# Patient Record
Sex: MALE | Race: BLACK OR AFRICAN AMERICAN | NOT HISPANIC OR LATINO | Employment: FULL TIME | URBAN - METROPOLITAN AREA
[De-identification: names, ages, dates, MRNs, and addresses within clinical notes are randomized per-mention and may not be internally consistent; named-entity substitution may affect disease eponyms.]

---

## 2024-04-13 ENCOUNTER — HOSPITAL ENCOUNTER (EMERGENCY)
Facility: HOSPITAL | Age: 24
Discharge: HOME | End: 2024-04-13
Attending: STUDENT IN AN ORGANIZED HEALTH CARE EDUCATION/TRAINING PROGRAM
Payer: COMMERCIAL

## 2024-04-13 VITALS
HEART RATE: 94 BPM | BODY MASS INDEX: 40.43 KG/M2 | HEIGHT: 74 IN | TEMPERATURE: 96.8 F | SYSTOLIC BLOOD PRESSURE: 151 MMHG | WEIGHT: 315 LBS | OXYGEN SATURATION: 99 % | RESPIRATION RATE: 18 BRPM | DIASTOLIC BLOOD PRESSURE: 70 MMHG

## 2024-04-13 DIAGNOSIS — M79.89 LEG SWELLING: Primary | ICD-10-CM

## 2024-04-13 PROCEDURE — 99281 EMR DPT VST MAYX REQ PHY/QHP: CPT | Performed by: STUDENT IN AN ORGANIZED HEALTH CARE EDUCATION/TRAINING PROGRAM

## 2024-04-13 ASSESSMENT — COLUMBIA-SUICIDE SEVERITY RATING SCALE - C-SSRS
1. IN THE PAST MONTH, HAVE YOU WISHED YOU WERE DEAD OR WISHED YOU COULD GO TO SLEEP AND NOT WAKE UP?: NO
2. HAVE YOU ACTUALLY HAD ANY THOUGHTS OF KILLING YOURSELF?: NO
6. HAVE YOU EVER DONE ANYTHING, STARTED TO DO ANYTHING, OR PREPARED TO DO ANYTHING TO END YOUR LIFE?: NO

## 2024-04-13 ASSESSMENT — LIFESTYLE VARIABLES
EVER HAD A DRINK FIRST THING IN THE MORNING TO STEADY YOUR NERVES TO GET RID OF A HANGOVER: NO
EVER FELT BAD OR GUILTY ABOUT YOUR DRINKING: NO
HAVE YOU EVER FELT YOU SHOULD CUT DOWN ON YOUR DRINKING: NO
TOTAL SCORE: 0
HAVE PEOPLE ANNOYED YOU BY CRITICIZING YOUR DRINKING: NO

## 2024-04-13 ASSESSMENT — PAIN SCALES - GENERAL: PAINLEVEL_OUTOF10: 0 - NO PAIN

## 2024-04-13 ASSESSMENT — PAIN - FUNCTIONAL ASSESSMENT: PAIN_FUNCTIONAL_ASSESSMENT: 0-10

## 2024-04-13 NOTE — ED PROVIDER NOTES
HPI   Chief Complaint   Patient presents with    Joint Swelling     Pt stated he had a swollen right ankle. Pt stated he hurt that ankle 1 month ago. Pt does have pitting edema in the ankle. Pt denied any medical hx.       HPI  See [facility]                  No data recorded                   Patient History   No past medical history on file.  No past surgical history on file.  No family history on file.  Social History     Tobacco Use    Smoking status: Not on file    Smokeless tobacco: Not on file   Substance Use Topics    Alcohol use: Not on file    Drug use: Not on file       Physical Exam   ED Triage Vitals [04/13/24 0020]   Temperature Heart Rate Respirations BP   36 °C (96.8 °F) (!) 120 18 (!) 155/93      Pulse Ox Temp Source Heart Rate Source Patient Position   99 % Tympanic -- --      BP Location FiO2 (%)     -- --       Physical Exam  See [facility]  ED Course & [facility]   Diagnoses as of 04/13/24 0055   Leg swelling       Medical Decision Making  23-year-old male without significant past medical history presents emergency room with leg swelling.  Patient noticed that when he pushed his thumb into his leg it left a hosea and when he googled it and he thought he might have congestive heart failure.  Patient otherwise takes no medication regularly and otherwise denies any significant shortness of breath, chest pain, abdominal pain, diarrhea, constipation, nausea, vomiting, fevers, chills.    ED Triage Vitals [04/13/24 0020]   Temperature Heart Rate Respirations BP   36 °C (96.8 °F) (!) 120 18 (!) 155/93      Pulse Ox Temp Source Heart Rate Source Patient Position   99 % Tympanic -- --      BP Location FiO2 (%)     -- --       Vital signs reviewed: Afebrile, normotensive, not tachycardic, 99% on room air    Exam     Constitutional: No acute distress. Resting comfortably.   Head: Normocephalic, atraumatic.   Eyes: Pupils equal bilaterally, EOM grossly intact, conjunctiva normal.  Mouth/Throat: Oropharynx is clear, moist mucus  membranes.   Neck: Supple. No lymphadenopathy.  Cardiovascular: Regular rate and regular rhythm. Extremities are well-perfused.   Pulmonary/Chest: No respiratory distress, breathing comfortably on room air.    Abdominal: Soft, non-tender, non-distended. No rebound or guarding.   Musculoskeletal: No lower extremity edema.       Skin: Warm, dry, and intact.   Neurological: Patient is oriented to person, place, time, and situation. Face symmetric, hearing intact to voice, speech normal. Moves all extremities.   Psych: Mood, affect, thought content, and judgment normal.    Differential includes but is not limited to:  Obesity versus CHF versus DVT    Labs: Considered but not indicated.  Labs Reviewed - No data to display    Radiology: Considered but not indicated    ECG/medicine tests: ordered and independent interpretation performed.  Diagnoses as of 04/13/24 0055   Leg swelling     Patient without any significant symptoms on exam and denies any shortness of breath, leg pain, or ankle pain.  On exam patient has no leg swelling and bilateral ankles are symmetric.  Patient without any erythema or tenderness to palpation and otherwise without risk factors for DVT.  Patient is otherwise young without comorbidities besides obesity and at this time suspect only habitus.    Patient told if he develops shortness of breath, or pain in the area due to trauma or fall to return to the emergency room for reevaluation.  He is otherwise to follow-up outpatient with his primary care provider for further care and evaluation.    PLAN AND FOLLOW-UP: Patient counseled on all findings, diagnosis and treatment plan. Patient's questions and concerns addressed. Patient stable, discharged with instructions to follow up with PMD, and to return to ED at any time for worsening symptoms or any other concerns. Patient demonstrates understanding of the findings and the importance of appropriate follow up care.    ED Medications  managed:    Medications - No data to display    Diagnostic tests considered but not performed: Ultrasound lower extremity, patient without edema or swelling appreciated that is unilateral or asymmetric at this time      PATIENT REFERRED TO:  No follow-up provider specified.    DISCHARGE MEDICATIONS:  New Prescriptions    No medications on file       Kameron Gar MD  12:55 AM    Attending Emergency Physician  [facility] EMERGENCY MEDICINE            Procedure  Procedures     Kameron Gar MD  04/13/24 0055